# Patient Record
Sex: MALE | Race: WHITE | NOT HISPANIC OR LATINO | URBAN - NONMETROPOLITAN AREA
[De-identification: names, ages, dates, MRNs, and addresses within clinical notes are randomized per-mention and may not be internally consistent; named-entity substitution may affect disease eponyms.]

---

## 2022-04-27 ENCOUNTER — OFFICE (OUTPATIENT)
Dept: URBAN - NONMETROPOLITAN AREA CLINIC 5 | Facility: CLINIC | Age: 42
End: 2022-04-27
Payer: OTHER GOVERNMENT

## 2022-04-27 VITALS
SYSTOLIC BLOOD PRESSURE: 109 MMHG | RESPIRATION RATE: 16 BRPM | WEIGHT: 196 LBS | HEIGHT: 70 IN | DIASTOLIC BLOOD PRESSURE: 73 MMHG | HEART RATE: 61 BPM

## 2022-04-27 DIAGNOSIS — K57.30 DIVERTICULOSIS OF LARGE INTESTINE WITHOUT PERFORATION OR ABS: ICD-10-CM

## 2022-04-27 DIAGNOSIS — K57.32 DIVERTICULITIS OF LARGE INTESTINE WITHOUT PERFORATION OR ABS: ICD-10-CM

## 2022-04-27 PROCEDURE — 99204 OFFICE O/P NEW MOD 45 MIN: CPT | Performed by: INTERNAL MEDICINE

## 2022-04-27 RX ORDER — CIPROFLOXACIN HYDROCHLORIDE 500 MG/1
1000 TABLET, FILM COATED ORAL
Qty: 20 | Refills: 0 | Status: ACTIVE
Start: 2022-04-27

## 2022-04-27 NOTE — SERVICENOTES
I had a lengthy discussion with the patient about the fact that the early age of onset and severity of his diverticulitis may lead to a need to consider elective sigmoid colectomy.   should his episodes become more frequent or severe, then we will need to have that discussion.  I think it is important for him to keep a prescription of antibiotics at home to begin should his symptoms return in the future.  He also knows to call this office  if he has another bout of diverticulitis.

## 2022-04-27 NOTE — SERVICEHPINOTES
Wilbur Isaac   is a   42 yo  male   whom I am seeing as a new patient today. the patient is a healthy 41-year-old white male who comes to see me today about a recent episode of acute diverticulitis.  Patient was seen in the emergency room in [place] on 12/26/2021 after onset of progressive left lower quadrant pain that became severe.  He was seen in the emergency room by Dr. Medina with white blood cell count of 5.0 and a normal hematocrit.  His CT scan of the abdomen and pelvis suggested inflammatory stranding in the region of the sigmoid colon associated with diverticulosis, consistent with acute diverticulitis.  He was treated with a combination of Cipro and Flagyl for 10 days and his pain resolved.  He has had no further bouts of pain since that time.  He was seen and treated in [place] about 2 years ago for a similar type problem with acute diverticulitis that did not require hospitalization.  In addition, he has had a colonoscopy done within the last year or 2 there in [place] which she states revealed some polyps and diverticulosis.  He does not want to go back to [place] for his care.  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  There is no family history of colon cancer.

## 2023-04-24 ENCOUNTER — OFFICE (OUTPATIENT)
Dept: URBAN - NONMETROPOLITAN AREA CLINIC 5 | Facility: CLINIC | Age: 43
End: 2023-04-24
Payer: OTHER GOVERNMENT

## 2023-04-24 VITALS
HEIGHT: 70 IN | SYSTOLIC BLOOD PRESSURE: 125 MMHG | RESPIRATION RATE: 20 BRPM | WEIGHT: 197 LBS | HEART RATE: 73 BPM | DIASTOLIC BLOOD PRESSURE: 87 MMHG

## 2023-04-24 DIAGNOSIS — F19.90 OTHER PSYCHOACTIVE SUBSTANCE USE, UNSPECIFIED, UNCOMPLICATED: ICD-10-CM

## 2023-04-24 DIAGNOSIS — K62.5 HEMORRHAGE OF ANUS AND RECTUM: ICD-10-CM

## 2023-04-24 DIAGNOSIS — K57.92 DIVERTICULITIS OF INTESTINE, PART UNSPECIFIED, WITHOUT PERFO: ICD-10-CM

## 2023-04-24 DIAGNOSIS — R19.4 CHANGE IN BOWEL HABIT: ICD-10-CM

## 2023-04-24 DIAGNOSIS — R68.83 CHILLS (WITHOUT FEVER): ICD-10-CM

## 2023-04-24 DIAGNOSIS — R10.32 LEFT LOWER QUADRANT PAIN: ICD-10-CM

## 2023-04-24 DIAGNOSIS — M54.50 LOW BACK PAIN, UNSPECIFIED: ICD-10-CM

## 2023-04-24 PROCEDURE — 99214 OFFICE O/P EST MOD 30 MIN: CPT | Performed by: NURSE PRACTITIONER

## 2023-05-12 ENCOUNTER — ON CAMPUS - OUTPATIENT (OUTPATIENT)
Dept: URBAN - NONMETROPOLITAN AREA HOSPITAL 28 | Facility: HOSPITAL | Age: 43
End: 2023-05-12
Payer: OTHER GOVERNMENT

## 2023-05-12 DIAGNOSIS — R19.4 CHANGE IN BOWEL HABIT: ICD-10-CM

## 2023-05-12 DIAGNOSIS — D12.2 BENIGN NEOPLASM OF ASCENDING COLON: ICD-10-CM

## 2023-05-12 DIAGNOSIS — K64.0 FIRST DEGREE HEMORRHOIDS: ICD-10-CM

## 2023-05-12 DIAGNOSIS — R10.32 LEFT LOWER QUADRANT PAIN: ICD-10-CM

## 2023-05-12 PROCEDURE — 45385 COLONOSCOPY W/LESION REMOVAL: CPT | Performed by: INTERNAL MEDICINE

## 2023-10-13 ENCOUNTER — OFFICE (OUTPATIENT)
Dept: URBAN - NONMETROPOLITAN AREA CLINIC 5 | Facility: CLINIC | Age: 43
End: 2023-10-13
Payer: OTHER GOVERNMENT

## 2023-10-13 VITALS
HEART RATE: 61 BPM | HEIGHT: 70 IN | SYSTOLIC BLOOD PRESSURE: 127 MMHG | DIASTOLIC BLOOD PRESSURE: 103 MMHG | DIASTOLIC BLOOD PRESSURE: 102 MMHG | RESPIRATION RATE: 20 BRPM | WEIGHT: 206 LBS | SYSTOLIC BLOOD PRESSURE: 130 MMHG

## 2023-10-13 DIAGNOSIS — K21.9 GASTRO-ESOPHAGEAL REFLUX DISEASE WITHOUT ESOPHAGITIS: ICD-10-CM

## 2023-10-13 DIAGNOSIS — Z86.010 PERSONAL HISTORY OF COLONIC POLYPS: ICD-10-CM

## 2023-10-13 PROCEDURE — 99213 OFFICE O/P EST LOW 20 MIN: CPT | Performed by: INTERNAL MEDICINE

## 2023-10-13 NOTE — SERVICEHPINOTES
Wilbur Isaac   is a   43   year old  male   who is here today for routine follow up. the patient is a 43-year-old white male who returns today for follow-up.  Symptoms of reflux are well controlled on Nexium.  He is having no dysphagia or odynophagia.  Most recent colonoscopy 05/12/2023 revealed a sessile serrated polyp in the cecum that was removed along with internal hemorrhoids.  Patient is asymptomatic without symptoms of dysphagia, odynophagia, or abdominal pain.  He had no evidence of diverticulosis on colonoscopy.  He states that he did have some abdominal pain several months ago and eventually was found to have "kidney stones".   He has had no further problems with this.